# Patient Record
Sex: FEMALE | Race: WHITE | NOT HISPANIC OR LATINO | Employment: FULL TIME | URBAN - METROPOLITAN AREA
[De-identification: names, ages, dates, MRNs, and addresses within clinical notes are randomized per-mention and may not be internally consistent; named-entity substitution may affect disease eponyms.]

---

## 2018-09-18 ENCOUNTER — APPOINTMENT (EMERGENCY)
Dept: RADIOLOGY | Facility: HOSPITAL | Age: 43
End: 2018-09-18

## 2018-09-18 ENCOUNTER — HOSPITAL ENCOUNTER (EMERGENCY)
Facility: HOSPITAL | Age: 43
Discharge: HOME | End: 2018-09-18
Attending: EMERGENCY MEDICINE | Admitting: EMERGENCY MEDICINE

## 2018-09-18 VITALS
WEIGHT: 170 LBS | RESPIRATION RATE: 18 BRPM | TEMPERATURE: 98.1 F | OXYGEN SATURATION: 95 % | HEART RATE: 72 BPM | SYSTOLIC BLOOD PRESSURE: 128 MMHG | DIASTOLIC BLOOD PRESSURE: 78 MMHG

## 2018-09-18 DIAGNOSIS — S42.402A CLOSED FRACTURE DISLOCATION OF LEFT ELBOW, INITIAL ENCOUNTER: Primary | ICD-10-CM

## 2018-09-18 DIAGNOSIS — S69.92XA INJURY OF LEFT WRIST, INITIAL ENCOUNTER: ICD-10-CM

## 2018-09-18 PROBLEM — S82.892A CLOSED FRACTURE DISLOCATION OF LEFT ANKLE JOINT: Status: ACTIVE | Noted: 2018-09-18

## 2018-09-18 PROCEDURE — 73110 X-RAY EXAM OF WRIST: CPT

## 2018-09-18 PROCEDURE — 73070 X-RAY EXAM OF ELBOW: CPT

## 2018-09-18 PROCEDURE — 73080 X-RAY EXAM OF ELBOW: CPT

## 2018-09-18 PROCEDURE — 99284 EMERGENCY DEPT VISIT MOD MDM: CPT

## 2018-09-18 RX ORDER — IBUPROFEN 800 MG/1
800 TABLET ORAL EVERY 8 HOURS PRN
Qty: 30 TABLET | Refills: 0 | Status: SHIPPED | OUTPATIENT
Start: 2018-09-18 | End: 2018-09-18

## 2018-09-18 RX ORDER — PROPOFOL 10 MG/ML
100 INJECTION, EMULSION INTRAVENOUS ONCE
Status: COMPLETED | OUTPATIENT
Start: 2018-09-18 | End: 2018-09-18

## 2018-09-18 RX ORDER — TRAMADOL HYDROCHLORIDE 50 MG/1
50 TABLET ORAL ONCE
Status: DISCONTINUED | OUTPATIENT
Start: 2018-09-18 | End: 2018-09-18

## 2018-09-18 RX ADMIN — PROPOFOL 100 MG: 10 INJECTION, EMULSION INTRAVENOUS at 13:56

## 2018-09-18 NOTE — ED NOTES
PT OOB to BR  Pt ambulates with steady gait without assist   No distress noted         Patricia Crespo, RN  09/18/18 9417

## 2018-09-18 NOTE — ED PROCEDURE NOTE
Procedure  Procedural Sedation  Date/Time: 9/18/2018 1:55 PM  Performed by: Jenny Pedersen by: Kvng Juarez     Consent:     Consent obtained:  Verbal and written    Consent given by:  Patient    Risks discussed: Allergic reaction, dysrhythmia, inadequate sedation, nausea, vomiting, respiratory compromise necessitating ventilatory assistance and intubation, prolonged sedation necessitating reversal and prolonged hypoxia resulting in organ damage    Alternatives discussed:  Analgesia without sedation  Universal protocol:     Procedure explained and questions answered to patient or proxy's satisfaction: yes      Relevant documents present and verified: yes      Test results available and properly labeled: yes      Radiology Images displayed and confirmed    If images not available, report reviewed: yes      Required blood products, implants, devices, and special equipment available: yes      Site/side marked: yes      Immediately prior to procedure a time out was called: yes      Patient identity confirmation method:  Verbally with patient and arm band  Indications:     Sedation purpose:  Dislocation reduction    Procedure necessitating sedation performed by:  Different physician (Dr Geetha Cross)    Intended level of sedation:  Moderate (conscious sedation)  Pre-sedation assessment:     Time since last food or drink:  Last evening     ASA classification: class 1 - normal, healthy patient      Neck mobility: normal      Mouth opening:  3 or more finger widths    Mallampati score:  I - soft palate, uvula, fauces, pillars visible    Pre-sedation assessments completed and reviewed: airway patency, cardiovascular function, hydration status, mental status, nausea/vomiting, pain level, respiratory function and temperature      History of difficult intubation: no      Pre-sedation assessment completed:  9/18/2018 1:50 PM  Immediate pre-procedure details:     Reassessment: Patient reassessed immediately prior to procedure      Reviewed: vital signs, relevant labs/tests and NPO status      Verified: bag valve mask available, emergency equipment available, intubation equipment available, IV patency confirmed, oxygen available, reversal medications available and suction available    Procedure details (see MAR for exact dosages):     Sedation start time:  9/18/2018 1:55 PM    Preoxygenation:  Room air and nasal cannula    Sedation:  Propofol    Analgesia:  None    Intra-procedure monitoring:  Blood pressure monitoring, cardiac monitor, continuous pulse oximetry, continuous capnometry, frequent LOC assessments and frequent vital sign checks    Intra-procedure events: none      Sedation end time:  9/18/2018 2:05 PM  Post-procedure details:     Post-sedation assessment completed:  9/18/2018 2:10 PM    Attendance: Constant attendance by certified staff until patient recovered      Recovery: Patient returned to pre-procedure baseline      Post-sedation assessments completed and reviewed: airway patency, cardiovascular function, hydration status, mental status, nausea/vomiting, pain level, respiratory function and temperature      Patient is stable for discharge or admission: yes      Patient tolerance:   Tolerated well, no immediate complications  Orthopedic Injury  Date/Time: 9/18/2018 2:22 PM  Performed by: Renetta Young  Authorized by: Renetta Young     Patient Location:  ED  Other Assisting Provider: Yes (comment) (Dr Kika Matias)    Verbal consent obtained?: Yes    Written consent obtained?: Yes    Risks and benefits: Risks, benefits and alternatives were discussed    Consent given by:  Patient  Patient states understanding of procedure being performed: Yes    Patient's understanding of procedure matches consent: Yes    Procedure consent matches procedure scheduled: Yes    Relevant documents present and verified: Yes    Test results available and properly labeled: Yes    Site marked: Yes    Radiology Images displayed and confirmed  If images not available, report reviewed: Yes    Required items: Required blood products, implants, devices and special equipment available    Patient identity confirmed:  Verbally with patient  Time out: Immediately prior to the procedure a time out was called    Injury location:  Elbow  Location details:  Left elbow  Injury type:  Fracture-dislocation  Distal perfusion: normal    Neurological function: normal    Range of motion: normal    Local anesthesia used?: No    General anesthesia used?: No    Sedation type:   Moderate (conscious) sedation (See separate Procedural Sedation form)  Manipulation performed?: Yes    Reduction successful?: Yes    Confirmation: Reduction confirmed by x-ray    Immobilization:  Splint  Splint type:  Long arm (long arm and thumb spica splint)  Supplies used:  Cotton padding, elastic bandage and Ortho-Glass  Neurovascular status: Neurovascularly intact    Distal perfusion: normal    Neurological function: normal    Range of motion: normal    Patient tolerance:  Patient tolerated the procedure well with no immediate complications                     Hammad Gutierrez PA-C  09/18/18 5447

## 2018-09-18 NOTE — ED PROVIDER NOTES
History  Chief Complaint   Patient presents with    Elbow Pain     pt presents to the ed with left elbow pain  pt states that she fell of a step ladder this morning  42 y/o female presenting with left elbow and wrist pain after fall off of a ladder that occurred at 8:00 a m  this morning approximately the 3-4 feet off the ground  Patient relays that she was trying to fix an awning when she accidentally lost balance falling directly onto her left arm  Did not hit her head or lose consciousness  Was able to get up afterwards and ambulate without difficulty  Pain is 8/10 nonradiating  Denies numbness, paresthesias, open injury, neck pain, weakness chest pain, abdominal pain  None       History reviewed  No pertinent past medical history  History reviewed  No pertinent surgical history  History reviewed  No pertinent family history  I have reviewed and agree with the history as documented  Social History   Substance Use Topics    Smoking status: Current Every Day Smoker     Packs/day: 0 50    Smokeless tobacco: Not on file    Alcohol use No        Review of Systems   Constitutional: Negative  HENT: Negative  Eyes: Negative  Respiratory: Negative  Cardiovascular: Negative  Gastrointestinal: Negative  Genitourinary: Negative  Musculoskeletal: Positive for arthralgias and joint swelling  Negative for back pain, gait problem, myalgias, neck pain and neck stiffness  Skin: Negative  Neurological: Negative  All other systems reviewed and are negative  Physical Exam  Physical Exam   Constitutional: She is oriented to person, place, and time  She appears well-developed and well-nourished  HENT:   Head: Normocephalic and atraumatic  Right Ear: External ear normal    Left Ear: External ear normal    Nose: Nose normal    Mouth/Throat: Oropharynx is clear and moist    Eyes: Conjunctivae and EOM are normal  Pupils are equal, round, and reactive to light  Neck: Normal range of motion  Neck supple  Cardiovascular: Normal rate, regular rhythm, normal heart sounds and intact distal pulses  Exam reveals no gallop and no friction rub  No murmur heard  Pulmonary/Chest: Effort normal and breath sounds normal  No respiratory distress  She has no wheezes  She has no rales  She exhibits no tenderness  S PO2 is 95% indicating adequate oxygenation  Musculoskeletal:        Arms:  No shoulder or cervical spine tenderness  Neurovascular exam intact  Neurological: She is alert and oriented to person, place, and time  Skin: Skin is warm and dry  Capillary refill takes less than 2 seconds  Nursing note and vitals reviewed  Vital Signs  ED Triage Vitals   Temperature Pulse Respirations Blood Pressure SpO2   09/18/18 1200 09/18/18 1200 09/18/18 1200 09/18/18 1200 09/18/18 1200   98 1 °F (36 7 °C) 74 18 129/77 99 %      Temp Source Heart Rate Source Patient Position - Orthostatic VS BP Location FiO2 (%)   09/18/18 1200 09/18/18 1200 09/18/18 1200 09/18/18 1200 --   Tympanic Monitor Lying Right arm       Pain Score       09/18/18 1348       Worst Possible Pain           Vitals:    09/18/18 1401 09/18/18 1406 09/18/18 1412 09/18/18 1431   BP: 133/74 125/70 121/78 128/78   Pulse: 80 78 72 72   Patient Position - Orthostatic VS: Lying Lying Lying Lying       Visual Acuity      ED Medications  Medications   propofol (DIPRIVAN) 200 MG/20ML bolus injection 100 mg (100 mg Intravenous Given 9/18/18 1356)       Diagnostic Studies  Results Reviewed     None                 XR wrist 3+ views LEFT   Final Result by Anat Jimenez MD (09/18 3393)      No acute osseous abnormality              Workstation performed: [id number]         XR elbow 3+ vw LEFT   ED Interpretation by Jane Chavez PA-C (09/18 1307)   Posterior dislocation of elbow      Final Result by Aida Goel MD (09/18 1305)      Acute fracture posterior dislocation of the elbow          Findings are consistent with emergency provider's preliminary reading                     Workstation performed: [id number]         XR elbow 2 views LEFT    (Results Pending)              Procedures  Procedures  Conscious Sedation Assessment      Classification Score   ASA Scale Assessment  1-Healthy patient, no disease outside surgical process filed at 09/18/2018 1350           Phone Contacts  ED Phone Contact    ED Course                               MDM  Number of Diagnoses or Management Options  Closed fracture dislocation of left ankle, initial encounter:   Diagnosis management comments: Posterior dislocation of the left elbow successfully reduced  Patient tolerated procedure well  Patient was placed in a sling  Posterior and thumb spica splint was placed as patient had scaphoid tenderness and questionable irregularity on xray  Will have patient follow up with Orthopedics for re-evaluation  Patient prior drug user and agrees to tylenol and naproxen use  Patient is informed to return to the emergency department for worsening of symptoms and was given proper education regarding their diagnosis and symptoms  Otherwise the patient is informed to follow up with their primary care doctor for re-evaluation  The patient verbalizes understanding and agrees with above assessment and plan  All questions were answered  Please Note: Fluency Direct voice recognition software may have been used in the creation of this document  Wrong words or sound a like substitutions may have occurred due to the inherent limitations of the voice software             Amount and/or Complexity of Data Reviewed  Tests in the radiology section of CPT®: reviewed and ordered  Review and summarize past medical records: yes  Independent visualization of images, tracings, or specimens: yes      CritCare Time    Disposition  Final diagnoses:   Closed fracture dislocation of left ankle, initial encounter     Time reflects when diagnosis was documented in both MDM as applicable and the Disposition within this note     Time User Action Codes Description Comment    9/18/2018  3:07 PM Appling Dredge Add [R93 388U] Fracture dislocation of elbow joint     9/18/2018  3:07 PM Juma Dredge Remove [C11 006I] Fracture dislocation of elbow joint     9/18/2018  3:08 PM Appling Dredge Add [V21 410R] Closed fracture dislocation of left ankle, initial encounter       ED Disposition     ED Disposition Condition Comment    Discharge  Merle Bethel discharge to home/self care  Condition at discharge: Good        Follow-up Information     Follow up With Specialties Details Why [address] Emergency Department Emergency Medicine Go to If symptoms worsen such as hand or finger discoloration, numbness or tingling of the hand etc  [address] [facility] ED, Eleazar Parsons [place], [address], MD Orthopedic Surgery Schedule an appointment as soon as possible for a visit in 1 day  [address]  [phone]             Patient's Medications   Discharge Prescriptions    IBUPROFEN (MOTRIN) 800 MG TABLET    Take 1 tablet (800 mg total) by mouth every 8 (eight) hours as needed for mild pain for up to 10 days       Start Date: 9/18/2018 End Date: 9/28/2018       Order Dose: 800 mg       Quantity: 30 tablet    Refills: 0     No discharge procedures on file      ED Provider  Electronically Signed by           Jordin Low PA-C  09/18/18 5441

## 2018-09-19 ENCOUNTER — OFFICE VISIT (OUTPATIENT)
Dept: OBGYN CLINIC | Facility: CLINIC | Age: 43
End: 2018-09-19

## 2018-09-19 VITALS
WEIGHT: 182.8 LBS | BODY MASS INDEX: 30.46 KG/M2 | HEIGHT: 65 IN | SYSTOLIC BLOOD PRESSURE: 128 MMHG | HEART RATE: 80 BPM | DIASTOLIC BLOOD PRESSURE: 84 MMHG

## 2018-09-19 DIAGNOSIS — S53.105A CLOSED TRAUMATIC DISLOCATION OF LEFT ELBOW JOINT: Primary | ICD-10-CM

## 2018-09-19 DIAGNOSIS — S52.132A CLOSED DISPLACED FRACTURE OF NECK OF LEFT RADIUS, INITIAL ENCOUNTER: ICD-10-CM

## 2018-09-19 PROCEDURE — 99204 OFFICE O/P NEW MOD 45 MIN: CPT | Performed by: ORTHOPAEDIC SURGERY

## 2018-09-19 RX ORDER — IBUPROFEN 600 MG/1
TABLET ORAL EVERY 6 HOURS PRN
COMMUNITY

## 2018-09-19 NOTE — PROGRESS NOTES
Assessment/Plan:  1  Closed traumatic dislocation of left elbow joint  CT elbow left wo contrast   2  Closed displaced fracture of neck of left radius, initial encounter         Scribe Attestation    I,:   Karen Ceballos am acting as a scribe while in the presence of the attending physician :        I,:   Karoline Aceves MD personally performed the services described in this documentation    as scribed in my presence :            Anupam Bagley does demonstrate a significant injury to her left elbow  X-rays demonstrates a radial neck fracture as well as small fragments that were free-floating within the joint  I did discuss with her that she most likely require surgery to address this  However due to the fracture pattern she may develop further instability in the elbow  I did discuss options of removing the fragments her elbow however this will likely increased likelihood of instability in her elbow as she has ready previously demonstrated with the dislocation  Additionally we did discuss that due to the fracture pattern a replacement of the radial head with the prosthesis can be considered however I do have concern with the degree of fracture into the radial neck  I noted that even with a long stem of the prosthesis there is risk of the fracture propagating resulting in complications  I would like her to have a CT scan of her left elbow stat to further evaluate the extent of fracture into the radial neck prior to deciding on surgical intervention  I provided prescription for this today  I would like her to follow up with me on Friday when the CT scan is completed  Anupam Bagley is a  for her occupation I provided a note for her today that she will be restricted from work until further notice  Additionally we did provide a new posterior splint to her left elbow with sling  I will see her back on Friday 9/21/2018      Subjective:   Zachary Franco is a 41 y o  female who presents for evaluation of her left elbow   She states that she fell off a stepladder 9/18/2018 and landed onto her left arm  Resulting in a dislocation of the elbow  She was seen emergency department where they provided a reduction of the elbow  Postreduction elbow x-rays demonstrates a radial neck fracture  She states she is experiencing a constant and moderate to severe sharp pain globally about the elbow that radiates distally into the forearm wrist and hand  He states the pain is exacerbated with any movement or touch to the elbow  Pain is slightly better at rest with the splint and sling on  She notes that they prescribed her with prescription strength ibuprofen and notes that this does not provide any relief for her  She denies any distal paresthesias today      Review of Systems   Constitutional: Negative for chills, fever and unexpected weight change  HENT: Negative for hearing loss, nosebleeds and sore throat  Eyes: Negative for pain, redness and visual disturbance  Respiratory: Negative for cough, shortness of breath and wheezing  Cardiovascular: Negative for chest pain, palpitations and leg swelling  Gastrointestinal: Negative for abdominal pain, nausea and vomiting  Endocrine: Negative for polydipsia and polyuria  Genitourinary: Negative for dysuria and hematuria  Musculoskeletal: Positive for arthralgias, back pain, joint swelling and myalgias  See HPI   Skin: Negative for rash and wound  Neurological: Negative for dizziness, numbness and headaches  Psychiatric/Behavioral: Negative for decreased concentration and suicidal ideas  The patient is not nervous/anxious  History reviewed  No pertinent past medical history  Past Surgical History:   Procedure Laterality Date    TUBAL LIGATION  2001       History reviewed  No pertinent family history  Social History     Occupational History    Not on file       Social History Main Topics    Smoking status: Current Every Day Smoker     Packs/day: 0 50    Smokeless tobacco: Never Used    Alcohol use No    Drug use: No    Sexual activity: Not on file         Current Outpatient Prescriptions:     ibuprofen (MOTRIN) 200 mg tablet, Take by mouth every 6 (six) hours as needed for mild pain, Disp: , Rfl:   No current facility-administered medications for this visit  No Known Allergies    Objective:  Vitals:    09/19/18 1031   BP: 128/84   Pulse: 80       Left Elbow Exam     Tenderness   Left elbow tenderness location: Point tenderness globally of the elbow  And sensitivity to touch the dermal level of the forearm wrist and hand  Range of Motion   Extension: 20   Flexion: 70   Pronation: 10   Supination: 0     Muscle Strength   Left elbow normal strength: Omitted due to fracture  Other   Erythema: absent  Scars: absent  Sensation: normal  Pulse: present    Comments:  Stress test omitted due to fracture  Strength is intact distally into the finger and wrist extensors and flexors on the left  Sensation into the wrist and hand is intact  Physical Exam   Constitutional: She is oriented to person, place, and time  She appears well-developed and well-nourished  HENT:   Head: Normocephalic and atraumatic  Eyes: Conjunctivae are normal    Neck: Normal range of motion  Cardiovascular: Normal rate  Pulmonary/Chest: Effort normal    Musculoskeletal:   As noted in HPI   Neurological: She is alert and oriented to person, place, and time  Skin: Skin is warm and dry  Psychiatric: She has a normal mood and affect  Her behavior is normal  Judgment and thought content normal    Nursing note and vitals reviewed  I have personally reviewed pertinent films in PACS and my interpretation is as follows:  X-rays of the left elbow pre reduction demonstrates a posterior dislocation of the elbow with an acute impacted mildly comminuted radial head and neck fracture      X-rays of the left elbow postreduction demonstrate an acute comminuted radial head and neck fracture      X-rays of the left wrist demonstrates no acute fracture other osseous abnormalities

## 2018-09-19 NOTE — LETTER
September 19, 2018     Patient: Alexandre Valerio   YOB: 1975   Date of Visit: 9/19/2018       To Whom it May Concern:    Alexandre Valerio is under my professional care  She was seen in my office on 9/19/2018  She is to remain out of work until further notice due to significant injury to the radial neck of her left elbow  If you have any questions or concerns, please don't hesitate to call           Sincerely,          Mukesh Martinez MD        CC: No Recipients

## 2018-09-20 ENCOUNTER — TRANSCRIBE ORDERS (OUTPATIENT)
Dept: ADMINISTRATIVE | Facility: HOSPITAL | Age: 43
End: 2018-09-20

## 2018-09-20 ENCOUNTER — HOSPITAL ENCOUNTER (OUTPATIENT)
Dept: RADIOLOGY | Facility: HOSPITAL | Age: 43
Discharge: HOME | End: 2018-09-20
Attending: ORTHOPAEDIC SURGERY

## 2018-09-20 DIAGNOSIS — S53.105A CLOSED TRAUMATIC DISLOCATION OF LEFT ELBOW JOINT: ICD-10-CM

## 2018-09-20 PROCEDURE — 73200 CT UPPER EXTREMITY W/O DYE: CPT

## 2018-09-21 ENCOUNTER — OFFICE VISIT (OUTPATIENT)
Dept: OBGYN CLINIC | Facility: CLINIC | Age: 43
End: 2018-09-21

## 2018-09-21 VITALS — HEIGHT: 66 IN | WEIGHT: 182 LBS | BODY MASS INDEX: 29.25 KG/M2

## 2018-09-21 DIAGNOSIS — S53.105A CLOSED TRAUMATIC DISLOCATION OF LEFT ELBOW JOINT: Primary | ICD-10-CM

## 2018-09-21 DIAGNOSIS — S52.135D CLOSED NONDISPLACED FRACTURE OF NECK OF LEFT RADIUS WITH ROUTINE HEALING, SUBSEQUENT ENCOUNTER: ICD-10-CM

## 2018-09-21 PROCEDURE — 99214 OFFICE O/P EST MOD 30 MIN: CPT | Performed by: ORTHOPAEDIC SURGERY

## 2018-09-21 NOTE — PROGRESS NOTES
Assessment/Plan:  1  Closed traumatic dislocation of left elbow joint     2  Closed nondisplaced fracture of neck of left radius with routine healing, subsequent encounter         Sabino Marrufo is now 3 days out from her fracture dislocation of the elbow  Fortunately, her radial neck fracture appears to be well aligned and she only has a very small coronoid fracture  It appears based on her CT scan that she should do well with conservative treatment for this  Her splint was removed today and she will remain in her sling for the time being  She will continue to be out of work  She will follow up on Tuesday with our upper extremity specialist, Dr Jaciel Whitehead  We encouraged her to attempt very gentle range of motion when she is out of her sling for showering, etc  We again had a lengthy discussion about the risk of stiffness following this injury  She will likely begin therapy next week if all looks well with Dr Jaciel Whitehead  Subjective:   Rosette Rome is a 41 y o  female who presents today for follow-up of her left elbow  We had ordered a CT scan of her elbow to further evaluate her radial neck fracture and evaluate for intra-articular loose body  She sustained an elbow fracture/dislocation 3 days ago  She has been in a posterior long-arm splint since her last appointment 2 days ago  She notes ongoing pain diffusely about the elbow as well as pain into the wrist    This is worse with attempts at movement and better with rest    She is now able to move her fingers much more freely  She notes good sensation of the upper extremity  Review of Systems   Constitutional: Negative for chills, fever and unexpected weight change  HENT: Negative for hearing loss, nosebleeds and sore throat  Eyes: Negative for pain, redness and visual disturbance  Respiratory: Negative for cough, shortness of breath and wheezing  Cardiovascular: Negative for chest pain, palpitations and leg swelling     Gastrointestinal: Negative for abdominal pain, nausea and vomiting  Endocrine: Negative for polydipsia and polyuria  Genitourinary: Negative for dysuria and hematuria  Musculoskeletal:        See HPI   Skin: Negative for rash and wound  Neurological: Negative for dizziness, numbness and headaches  Psychiatric/Behavioral: Negative for decreased concentration and suicidal ideas  The patient is not nervous/anxious  History reviewed  No pertinent past medical history  Past Surgical History:   Procedure Laterality Date    TUBAL LIGATION  2001       History reviewed  No pertinent family history  Social History     Occupational History    Not on file  Social History Main Topics    Smoking status: Current Every Day Smoker     Packs/day: 0 50    Smokeless tobacco: Never Used    Alcohol use No    Drug use: No    Sexual activity: Not on file         Current Outpatient Prescriptions:     ibuprofen (MOTRIN) 200 mg tablet, Take by mouth every 6 (six) hours as needed for mild pain, Disp: , Rfl:     No Known Allergies    Objective: There were no vitals filed for this visit  Ortho Exam    Physical Exam   Constitutional: She is oriented to person, place, and time  She appears well-developed and well-nourished  No distress  HENT:   Head: Normocephalic and atraumatic  Eyes: Conjunctivae and EOM are normal  No scleral icterus  Neck: No JVD present  Cardiovascular: Normal rate and intact distal pulses  Pulmonary/Chest: Effort normal  No respiratory distress  Abdominal: She exhibits no distension  Neurological: She is alert and oriented to person, place, and time  Coordination normal    Skin: Skin is warm  Psychiatric: She has a normal mood and affect  Left upper extremity:    Diffuse swelling elbow  Tenderness over radial head and neck  Range of motion 60-85 degrees  Sensation intact radial, ulnar, and median nerve distributions  Compartments soft  2+ radial pulse    Patient moves all fingers and wrist freely without discomfort  I have personally reviewed pertinent films in PACS and my interpretation is as follows:  CT scan left elbow:  Well-aligned radial neck fracture  Small coronoid fracture

## 2018-09-25 ENCOUNTER — APPOINTMENT (OUTPATIENT)
Dept: RADIOLOGY | Facility: CLINIC | Age: 43
End: 2018-09-25

## 2018-09-25 ENCOUNTER — OFFICE VISIT (OUTPATIENT)
Dept: OBGYN CLINIC | Facility: CLINIC | Age: 43
End: 2018-09-25

## 2018-09-25 VITALS
HEIGHT: 65 IN | SYSTOLIC BLOOD PRESSURE: 130 MMHG | HEART RATE: 60 BPM | DIASTOLIC BLOOD PRESSURE: 83 MMHG | WEIGHT: 181 LBS | BODY MASS INDEX: 30.16 KG/M2

## 2018-09-25 DIAGNOSIS — S53.105A CLOSED TRAUMATIC DISLOCATION OF LEFT ELBOW JOINT: ICD-10-CM

## 2018-09-25 DIAGNOSIS — M25.522 PAIN IN LEFT ELBOW: ICD-10-CM

## 2018-09-25 DIAGNOSIS — S52.135D CLOSED NONDISPLACED FRACTURE OF NECK OF LEFT RADIUS WITH ROUTINE HEALING, SUBSEQUENT ENCOUNTER: Primary | ICD-10-CM

## 2018-09-25 PROCEDURE — 73080 X-RAY EXAM OF ELBOW: CPT

## 2018-09-25 PROCEDURE — 99243 OFF/OP CNSLTJ NEW/EST LOW 30: CPT | Performed by: ORTHOPAEDIC SURGERY

## 2018-09-25 NOTE — PROGRESS NOTES
Assessment/Plan:  1  Closed nondisplaced fracture of neck of left radius with routine healing, subsequent encounter     2  Pain in left elbow  XR elbow 3+ vw left   3  Closed traumatic dislocation of left elbow joint         Scribe Attestation    I,:   Gerri Ahuja MA am acting as a scribe while in the presence of the attending physician :        I,:   Ronda Osuna, DO personally performed the services described in this documentation    as scribed in my presence :              I discussed with Marcusdavisshoaib Bekca that her radial neck fracture is well-aligned and unchanged since her last x-ray performed on 9/18/18  I discussed with her that I feel as though we should be able to treat this nonoperatively  She was placed in a elbow ROM brace today locked at 110 and 40 degrees  I discussed with her supination and pronation exercsies at home with the elbow at 90 degrees  A referral was also provided for physical therapy today  She will follow up with me in 1 week for repeat evaluation and repeat x-ray  She uderstands that his injury may require surgery if not properly managed  She wishes to avoid surgery    Subjective:   Abebe Campos is a 41 y o  female who presents to the office today for follow up evaluation of her left closed nondisplaced fracture of the neck of the left radius and left elbow dislocation sustained on 9/18/18  Se noted a constant pain to the left elbow and occasional pain to her left wrist  She has been in a sling since her last visit with Dr Michael Malagon and states she has been compliant with sling use  She was referred to me today by my partner Dr Michael Malagon  She notes good sensation to her left upper extremity  Review of Systems   Constitutional: Negative for chills and fever  HENT: Negative for drooling and sneezing  Eyes: Negative for redness  Respiratory: Negative for cough and wheezing  Gastrointestinal: Negative for nausea and vomiting     Musculoskeletal: Positive for arthralgias and myalgias  Negative for joint swelling  Neurological: Negative for weakness and numbness  Psychiatric/Behavioral: Negative for behavioral problems  The patient is not nervous/anxious  History reviewed  No pertinent past medical history  Past Surgical History:   Procedure Laterality Date    TUBAL LIGATION  2001       History reviewed  No pertinent family history  Social History     Occupational History    Not on file  Social History Main Topics    Smoking status: Current Every Day Smoker     Packs/day: 0 50    Smokeless tobacco: Never Used    Alcohol use No    Drug use: No    Sexual activity: Not on file         Current Outpatient Prescriptions:     ibuprofen (MOTRIN) 600 mg tablet, Take by mouth every 6 (six) hours as needed for mild pain, Disp: , Rfl:     No Known Allergies    Objective:  Vitals:    09/25/18 1117   BP: 130/83   Pulse: 60       Ortho Exam     Left Elbow    100 degrees of flexion  45 degree loss of extension  85 degrees pronation passively  80 degrees supination passively  No mechanical block to motion      Physical Exam   Constitutional: She is oriented to person, place, and time  She appears well-developed and well-nourished  HENT:   Head: Atraumatic  Eyes: Conjunctivae are normal    Neck: Normal range of motion  Cardiovascular: Normal rate  Pulmonary/Chest: Effort normal    Musculoskeletal:   As noted in HPI   Neurological: She is alert and oriented to person, place, and time  Skin: Skin is warm and dry  Psychiatric: She has a normal mood and affect   Her behavior is normal  Judgment and thought content normal        I have personally reviewed pertinent films in PACS and my interpretation is as follows:X-ray left elbow performed on 9/25/18 shows well aligned radial neck fracture unchanged form last x-ray performed on 9/18/18

## 2018-10-01 ENCOUNTER — EVALUATION (OUTPATIENT)
Dept: OCCUPATIONAL THERAPY | Facility: CLINIC | Age: 43
End: 2018-10-01

## 2018-10-01 DIAGNOSIS — S52.135D CLOSED NONDISPLACED FRACTURE OF NECK OF LEFT RADIUS WITH ROUTINE HEALING, SUBSEQUENT ENCOUNTER: ICD-10-CM

## 2018-10-01 DIAGNOSIS — S53.105A CLOSED TRAUMATIC DISLOCATION OF LEFT ELBOW JOINT: ICD-10-CM

## 2018-10-01 PROCEDURE — G8985 CARRY GOAL STATUS: HCPCS | Performed by: OCCUPATIONAL THERAPIST

## 2018-10-01 PROCEDURE — G8984 CARRY CURRENT STATUS: HCPCS | Performed by: OCCUPATIONAL THERAPIST

## 2018-10-01 PROCEDURE — 97165 OT EVAL LOW COMPLEX 30 MIN: CPT

## 2018-10-01 NOTE — PROGRESS NOTES
OT Evaluation     Today's date: 10/1/2018  Patient name: Maria Esther Smith  : 1975  MRN: [id number]  Referring provider: Magy Jimenez DO  Dx:   Encounter Diagnosis     ICD-10-CM    1  Closed traumatic dislocation of left elbow joint S53 105A Ambulatory referral to Physical Therapy   2  Closed nondisplaced fracture of neck of left radius with routine healing, subsequent encounter S52 135D Ambulatory referral to Physical Therapy                  Assessment  Impairments: abnormal or restricted ROM, activity intolerance, impaired physical strength, pain with function and weight-bearing intolerance    Assessment details: CASE SUMMARY:   Maria Esther Smith is a 41y o  year old female who is suffering from a closed non-traumatic fracture of the neck of the L radius, which occurred on 18 when she fell off a step ladder  Patient was in a sling which was replaced with an elbow ROM brace on   The brace is locked at 110 and 40 degrees  PMHx includes: generally healthy  See chart for full details with medications  She lives with her boyfriend and two young teens (15 & 15 yo)  Some of her interests include: shopping  She works as a   FUNCTIONAL SUMMARY:   Maria Esther Smith is independent in basic self care skills but reports pain when completing tasks like eating, bathing and brushing her hair  She has difficulty with lifting, cooking, cleaning and work tasks  FOTO score is 53% with a 47% limit in function  IMPAIRMENTS:  Maria Esther Smith with:  Mild edema noted in the elbow  Decreased ROM in the left elbow  Decreased strength in the left in the upper extremity  No reports of sensory problems  Increase pain rated at 3-10/10 level   Difficulty with ADLs and work tasks  Patient would benefit from Occupational Therapy to address these impairments in order to return to her prior level of function     Understanding of Dx/Px/POC: good   Prognosis: good    Goals  Short Term Goals:  To be completed in 6-8 weeks  1  Decrease edema of L elbow through manual lymphatic drainage massage and/or kinesiotape  2  Increase ROM of L elbow to WNLs, through the use of heat modalities, manual therapy with Graston techniques, PROM, and/or AROM exercises  3  Increase U/E/ wrist to 5/5 left =75% of unaffected side  4  Decrease pain to 0-2/10  Long Term Goals: To be completed in 8-10 weeks  1  Ability to perform lifting, carrying, cooking,cleaning tasks and work tasks with minimal to no discomfort  2  Patient demonstrates good carryover of HEP  3  Minimal to no pain complaints  0-2/10   4  Full ROM of L elbow  5  Improved U/E / wrist strength to 5/5 left =75% of unaffected side  Plan  Patient would benefit from: skilled occupational therapy  Planned modality interventions: thermotherapy: hydrocollator packs and ultrasound  Planned therapy interventions: home exercise program, therapeutic exercise, therapeutic activities, stretching, strengthening, patient education, manual therapy, massage, functional ROM exercises, flexibility and joint mobilization  Frequency: 2x week  Duration in visits: 16  Duration in weeks: 9        Subjective Evaluation    History of Present Illness  Date of onset: 9/18/2018  Not a recurrent problem   Pain  Current pain rating: 3  At best pain rating: 3  At worst pain rating: 10  Quality: radiating and sharp  Relieving factors: change in position and heat    Social Support  Lives with: significant other and young children    Working: [employer]    Hand dominance: left      Diagnostic Tests  X-ray: abnormal  Treatments  No previous or current treatments  Current treatment: occupational therapy  Patient Goals  Patient goals for therapy: decreased pain, increased motion, increased strength, return to work, decreased edema and independence with ADLs/IADLs  Patient goal: Get arm better        Objective     Active Range of Motion     Left Elbow   Flexion: 85 degrees   Extension: -45 degrees   Forearm supination: 85 degrees   Forearm pronation: 85 degrees     Right Elbow   Normal active range of motion    Strength/Myotome Testing     Right Wrist/Hand      (2nd hand position)     Trial 1: 55    Thumb Strength   Key/Lateral Pinch     Trial 1: 13  Tip/Two-Point Pinch     Trial 1: 11  Palmar/Three-Point Pinch     Trial 1: 15    Additional Strength Details  Left not tested at this time     Swelling   Left Elbow Girth Measurements   Joint line: 27 cm    Right Elbow Girth Measurements   Joint line: 26 cm      Flowsheet Rows      Most Recent Value   PT/OT G-Codes   Current Score  53   Projected Score  70   Assessment Type  Evaluation   G code set  Carrying, Moving & Handling Objects   Carrying, Moving and Handling Objects Current Status ()  CK   Carrying, Moving and Handling Objects Goal Status ()  CJ          Today's Treatment     Subjective: Patient reports pain level as 3/10 today  Objective: Completed initial evaluation  See report for details  Completed Hot pack for warm up and manual therapy (Graston to forearm and bicep; gentle massage to lateral epicondyle)  Instructed patient in elbow exercises to be completed at home  Home Program:See Media Section for details  Elbow exercises: ROM and Pronation/Supination     Precautions: Status post acute radial neck fracture and dislocation of the L elbow (9/18/18)    Assessment: Patient tolerated session fair  Patient would benefit from continued OT  Plan: Continue Occupational Therapy ~2x/week to decrease pain and edema and improve ROM, strength and function

## 2018-10-02 ENCOUNTER — APPOINTMENT (OUTPATIENT)
Dept: RADIOLOGY | Facility: CLINIC | Age: 43
End: 2018-10-02

## 2018-10-02 ENCOUNTER — OFFICE VISIT (OUTPATIENT)
Dept: OBGYN CLINIC | Facility: CLINIC | Age: 43
End: 2018-10-02

## 2018-10-02 VITALS
BODY MASS INDEX: 30.69 KG/M2 | DIASTOLIC BLOOD PRESSURE: 92 MMHG | HEIGHT: 65 IN | SYSTOLIC BLOOD PRESSURE: 137 MMHG | HEART RATE: 65 BPM | WEIGHT: 184.2 LBS

## 2018-10-02 DIAGNOSIS — M25.531 PAIN IN RIGHT WRIST: ICD-10-CM

## 2018-10-02 DIAGNOSIS — S52.135D CLOSED NONDISPLACED FRACTURE OF NECK OF LEFT RADIUS WITH ROUTINE HEALING, SUBSEQUENT ENCOUNTER: ICD-10-CM

## 2018-10-02 DIAGNOSIS — S52.135D CLOSED NONDISPLACED FRACTURE OF NECK OF LEFT RADIUS WITH ROUTINE HEALING, SUBSEQUENT ENCOUNTER: Primary | ICD-10-CM

## 2018-10-02 DIAGNOSIS — M25.532 PAIN IN LEFT WRIST: ICD-10-CM

## 2018-10-02 PROCEDURE — 73080 X-RAY EXAM OF ELBOW: CPT

## 2018-10-02 PROCEDURE — 99213 OFFICE O/P EST LOW 20 MIN: CPT | Performed by: ORTHOPAEDIC SURGERY

## 2018-10-02 PROCEDURE — 73100 X-RAY EXAM OF WRIST: CPT

## 2018-10-02 NOTE — PROGRESS NOTES
Assessment:       1  Closed nondisplaced fracture of neck of left radius with routine healing, subsequent encounter    2  Pain in right wrist          Plan:         Head is approximately 2 weeks status post closed reduction left elbow dislocation with associated radial neck and coronoid fracture  Since she has maintained reduction and fracture length we are attempting to treat this non operatively  Thus far x-rays have remained stable and she is progressing well with therapy  The hinged elbow brace was extended from 110° to 45°  She will continue with therapy and he will progress her motion over the next month  I am hopeful she will gain near full range of motion of her elbow  She claims she has been lifting old somewhat with the left arm at work despite being told not to bear any weight on the left arm  A I again urged her not to bear any weight on left arm  She says she will be compliant  She says she needs to work otherwise she cannot provide for family or provide)  She understands that if she is not compliant with my instructions she may require surgery to fix her elbow  She will follow up in 2 weeks for repeat films  She will continue therapy twice a week to increase her motion  Subjective:     Patient ID: Rosina Elias is a 41 y o  female  Chief Complaint:    HPI  Elbow Pain  Patient here for follow up of a left elbow pain  The pain has been present for 2 week  Pain described as sharp  Pain confined to lateral and  also associated with pain radiating to the forearm  Mechanism of injury : falling  Patient reports history of prior problems with this elbow  Ramu Briggs presents today for follow-up with her regarding her left elbow  She is 2 weeks status post closed treatment for terrible triad injury  She has maintained reduction of her radial neck fracture  She has also maintained reduction of her ulnar humeral joint    We have been attempting to treat this non operatively as she does not want surgery  She has been doing therapy twice a week in advance her motion in a hinged brace  Her pain is subsiding  Her swelling is subsiding  She is taking Advil for the pain  She is happy with her progress  She has been tolerating the brace well  Social History     Occupational History    Not on file  Social History Main Topics    Smoking status: Current Every Day Smoker     Packs/day: 0 50    Smokeless tobacco: Never Used    Alcohol use No    Drug use: No    Sexual activity: Not on file      Review of Systems   Constitutional: Negative  HENT: Negative  Eyes: Negative  Respiratory: Negative  Cardiovascular: Negative  Gastrointestinal: Negative  Genitourinary: Negative  Musculoskeletal: Positive for arthralgias  Allergic/Immunologic: Negative  Neurological: Negative  Hematological: Negative  Psychiatric/Behavioral: Negative  Objective:     Left Elbow Exam     Tenderness   The patient is experiencing tenderness in the lateral epicondyle  Range of Motion   Extension: 20   Flexion: 110   Pronation:  0 (50) abnormal   Supination: abnormal Left elbow supination: 50          Physical Exam   Constitutional: She is oriented to person, place, and time  She appears well-developed  HENT:   Head: Normocephalic  Eyes: Conjunctivae are normal    Neck: Normal range of motion  Pulmonary/Chest: Effort normal    Musculoskeletal: She exhibits no deformity  Neurological: She is alert and oriented to person, place, and time  She has normal reflexes  Skin: Skin is warm and dry  Psychiatric: She has a normal mood and affect  Her behavior is normal  Judgment and thought content normal         Left elbow  Patient has a flexion-extension arc of approximately 30° to 110°  There is no instability noted with pronation or supination  The elbow is much less swollen  There are no wounds  Left elbow is globally less tender    There is no instability with range of motion testing  Left wrist  FROm passive and active RC joint  Mildly tender over DRUJ  Neg shuck  NT over TFCC  NO edema    I have personally reviewed pertinent films in PACS  Multiple views left elbow demonstrate a left radial neck fracture which is not displaced since the initial injury  Also demonstrated a type 1 coronoid fracture  The ulnohumeral joint is well located  There is no loss of length of the radius  Two views left wrist demonstrate no fracture dislocation  The DRUJ is congruent  No signs of interosseous ligament injury

## 2018-10-02 NOTE — PATIENT INSTRUCTIONS
Elbow Dislocation   WHAT YOU NEED TO KNOW:   An elbow dislocation happens when the bones in the elbow are pulled apart  This causes stretching or tearing of the ligaments that hold the bones together in the elbow joint  DISCHARGE INSTRUCTIONS:   Medicine: You may need any of the following:  · NSAIDs  help decrease swelling, pain, and fever  This medicine is available without a doctor's order  NSAIDs can cause stomach bleeding or kidney problems  If you take blood thinner medicine, always ask your healthcare provider if NSAIDs are safe for you  Always read the medicine label and follow directions  · Acetaminophen  decreases pain and fever  It is available without a doctor's order  Ask how much to take and how often to take it  Follow directions  Acetaminophen can cause liver damage if not taken correctly  · Take your medicine as directed  Contact your healthcare provider if you think your medicine is not helping or if you have side effects  Tell him or her if you are allergic to any medicine  Keep a list of the medicines, vitamins, and herbs you take  Include the amounts, and when and why you take them  Bring the list or the pill bottles to follow-up visits  Carry your medicine list with you in case of an emergency  Self-care:   · Immobilize your elbow  as directed  You may need to wear a splint on your arm to keep the bones from moving  This helps decrease pain and allows your elbow to heal  You may be told to wear the splint during the day and at night  Put a folded wash cloth under your armpit to help make your arm and hand more comfortable  Ask if you can remove the splint for bathing or showering  Ice  your elbow for 15 to 20 minutes every hour or as directed  Use an ice pack, or put crushed ice in a plastic bag  Cover it with a towel  Ice helps prevent tissue damage and decreases swelling and pain  · Physical therapy  may be recommended   A physical therapist teaches you exercises to help improve movement and strength, and to decrease pain  Contact your healthcare provider if:   · Your pain or swelling gets worse  · You have trouble moving your elbow after your injury  · The bones in your elbow pop in and out of place more than once  · You have questions or concerns about your condition or care  Return to the emergency department if:   · Your arm feels numb or cold and looks pale  © 2017 [facility] Information is for End User's use only and may not be sold, redistributed or otherwise used for commercial purposes  All illustrations and images included in CareNotes® are the copyrighted property of A D A M [employer]  or [employer]  The above information is an  only  It is not intended as medical advice for individual conditions or treatments  Talk to your doctor, nurse or pharmacist before following any medical regimen to see if it is safe and effective for you

## 2018-10-03 ENCOUNTER — APPOINTMENT (OUTPATIENT)
Dept: OCCUPATIONAL THERAPY | Facility: CLINIC | Age: 43
End: 2018-10-03

## 2018-10-08 ENCOUNTER — APPOINTMENT (OUTPATIENT)
Dept: OCCUPATIONAL THERAPY | Facility: CLINIC | Age: 43
End: 2018-10-08

## 2018-10-10 ENCOUNTER — OFFICE VISIT (OUTPATIENT)
Dept: OCCUPATIONAL THERAPY | Facility: CLINIC | Age: 43
End: 2018-10-10

## 2018-10-10 DIAGNOSIS — S52.135D CLOSED NONDISPLACED FRACTURE OF NECK OF LEFT RADIUS WITH ROUTINE HEALING, SUBSEQUENT ENCOUNTER: Primary | ICD-10-CM

## 2018-10-10 PROCEDURE — 97110 THERAPEUTIC EXERCISES: CPT

## 2018-10-10 PROCEDURE — 97140 MANUAL THERAPY 1/> REGIONS: CPT

## 2018-10-10 NOTE — PROGRESS NOTES
Daily Note     Today's date: 10/10/2018  Patient name: Gigi Driver  : 1975  MRN: [id number]  Referring provider: Todd Gan DO  Dx:   Encounter Diagnosis     ICD-10-CM    1  Closed nondisplaced fracture of neck of left radius with routine healing, subsequent encounter S52 135D        Start Time: 730  Stop Time: 0820  Total time in clinic (min): 50 minutes    Subjective: 5-6/10  Posterior elbow      Objective:  Completed Hot pack for warm up, manual therapy, graston and myofascial release to elbow  exercises and activities to increase ROM, strength, coordination and function  See treatment diary below  Precautions : Falls    Specialty Daily Treatment Diary     Manual  10/10/18       graston To  Elbow forearm and wrist       Myofascial release with soft tissue work Gentle traction to elbow       kinesiotape  3" from posterior to anterior at elbow                           Exercise Diary  10/10/18       Small pegs 2 x 15       Clothe pegs Firm 2 x 20  Easy 2 x 14       supination/pronation with holding red therabar 2 x 15                                                                                                                                                   Modalities 10/10/18       Moist heat  X ~ 5 mins                               Assessment: Tolerated treatment well  Patient would benefit from continued OT  Patient doing well - she reports doing her HEP daily    Plan: Progress treament per protocol  Michelle Natashahillary has not returned for follow up therapy  She was called multiple times and on 10/29 her friend answered the phone and issued a new number for Michelle Hernandezsushil Araya was called and has not returned the call    At this time she is discharged from OT

## 2018-10-15 ENCOUNTER — APPOINTMENT (OUTPATIENT)
Dept: OCCUPATIONAL THERAPY | Facility: CLINIC | Age: 43
End: 2018-10-15

## 2018-10-17 ENCOUNTER — APPOINTMENT (OUTPATIENT)
Dept: OCCUPATIONAL THERAPY | Facility: CLINIC | Age: 43
End: 2018-10-17

## 2018-10-22 ENCOUNTER — APPOINTMENT (OUTPATIENT)
Dept: OCCUPATIONAL THERAPY | Facility: CLINIC | Age: 43
End: 2018-10-22

## 2018-10-29 ENCOUNTER — APPOINTMENT (OUTPATIENT)
Dept: OCCUPATIONAL THERAPY | Facility: CLINIC | Age: 43
End: 2018-10-29

## 2018-10-30 PROCEDURE — G8986 CARRY D/C STATUS: HCPCS

## 2018-10-30 PROCEDURE — G8985 CARRY GOAL STATUS: HCPCS

## 2018-10-31 ENCOUNTER — APPOINTMENT (OUTPATIENT)
Dept: OCCUPATIONAL THERAPY | Facility: CLINIC | Age: 43
End: 2018-10-31